# Patient Record
Sex: FEMALE | Race: WHITE | NOT HISPANIC OR LATINO | Employment: STUDENT | URBAN - NONMETROPOLITAN AREA
[De-identification: names, ages, dates, MRNs, and addresses within clinical notes are randomized per-mention and may not be internally consistent; named-entity substitution may affect disease eponyms.]

---
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
## 2023-05-31 ENCOUNTER — OFFICE VISIT (OUTPATIENT)
Dept: FAMILY MEDICINE CLINIC | Facility: CLINIC | Age: 23
End: 2023-05-31
Payer: COMMERCIAL

## 2023-05-31 VITALS
TEMPERATURE: 97.5 F | OXYGEN SATURATION: 98 % | WEIGHT: 207 LBS | DIASTOLIC BLOOD PRESSURE: 85 MMHG | HEART RATE: 97 BPM | HEIGHT: 65 IN | BODY MASS INDEX: 34.49 KG/M2 | SYSTOLIC BLOOD PRESSURE: 117 MMHG

## 2023-05-31 DIAGNOSIS — Z13.220 ENCOUNTER FOR SCREENING FOR LIPID DISORDER: ICD-10-CM

## 2023-05-31 DIAGNOSIS — R59.0 POSTERIOR AURICULAR LYMPHADENOPATHY: Primary | ICD-10-CM

## 2023-05-31 DIAGNOSIS — Z13.1 SCREENING FOR DIABETES MELLITUS: ICD-10-CM

## 2023-05-31 DIAGNOSIS — Z13.0 SCREENING FOR DEFICIENCY ANEMIA: ICD-10-CM

## 2023-05-31 DIAGNOSIS — E66.09 CLASS 1 OBESITY DUE TO EXCESS CALORIES WITHOUT SERIOUS COMORBIDITY IN ADULT, UNSPECIFIED BMI: ICD-10-CM

## 2023-05-31 DIAGNOSIS — Z00.00 ANNUAL PHYSICAL EXAM: Primary | ICD-10-CM

## 2023-05-31 DIAGNOSIS — Z23 NEED FOR VACCINATION: ICD-10-CM

## 2023-05-31 DIAGNOSIS — Z11.59 ENCOUNTER FOR HEPATITIS C SCREENING TEST FOR LOW RISK PATIENT: ICD-10-CM

## 2023-05-31 DIAGNOSIS — Z13.29 SCREENING FOR THYROID DISORDER: ICD-10-CM

## 2023-05-31 PROBLEM — J02.9 ACUTE PHARYNGITIS: Status: ACTIVE | Noted: 2023-03-14

## 2023-05-31 PROBLEM — F32.A DEPRESSIVE DISORDER: Status: ACTIVE | Noted: 2021-09-22

## 2023-05-31 PROBLEM — K52.9 CHRONIC DIARRHEA: Status: ACTIVE | Noted: 2019-09-16

## 2023-05-31 PROBLEM — A09: Status: ACTIVE | Noted: 2019-09-16

## 2023-05-31 PROBLEM — J06.9 ACUTE UPPER RESPIRATORY INFECTION: Status: ACTIVE | Noted: 2023-03-14

## 2023-05-31 PROBLEM — H65.03 BILATERAL ACUTE SEROUS OTITIS MEDIA: Status: ACTIVE | Noted: 2023-03-27

## 2023-05-31 PROBLEM — R00.0 INAPPROPRIATE SINUS TACHYCARDIA: Status: ACTIVE | Noted: 2022-04-05

## 2023-05-31 PROBLEM — J02.9 SORE THROAT: Status: ACTIVE | Noted: 2023-03-14

## 2023-05-31 PROBLEM — R19.7 DIARRHEA: Status: ACTIVE | Noted: 2019-09-16

## 2023-05-31 RX ORDER — ESCITALOPRAM OXALATE 10 MG/1
10 TABLET ORAL DAILY
COMMUNITY

## 2023-05-31 RX ORDER — ETONOGESTREL 68 MG/1
1 IMPLANT SUBCUTANEOUS ONCE
COMMUNITY

## 2023-05-31 NOTE — PROGRESS NOTES
Subjective     Chief Complaint   Patient presents with    Immunizations     Needs tdap for fall school      Annual Exam              History of Present Illness    Patient presents today to establish care and for annual physical.  She is needing a TDAP for [facility].  She starts school in the fall.  Has pap scheduled for July.      Patient's PMR from outside medical facility reviewed and noted.    Review of Systems   Constitutional:  Negative for fatigue.   Endocrine: Negative for polydipsia, polyphagia and polyuria.   Genitourinary:  Negative for menstrual problem, vaginal bleeding, vaginal discharge and vaginal pain.      Otherwise complete ROS reviewed and negative except as mentioned in the HPI.    Past Medical History:   Past Medical History:   Diagnosis Date    Bilateral acute serous otitis media 3/27/2023    Depressive disorder 9/22/2021     Past Surgical History:  Past Surgical History:   Procedure Laterality Date    CHOLECYSTECTOMY       Social History:  reports that she has never smoked. She has never used smokeless tobacco. She reports that she does not drink alcohol and does not use drugs.    Family History: family history includes No Known Problems in her father and mother.      Allergies:  Allergies   Allergen Reactions    Amoxicillin-Pot Clavulanate Other (See Comments)    Cefdinir GI Intolerance     Medications:  Prior to Admission medications    Medication Sig Start Date End Date Taking? Authorizing Provider   escitalopram (LEXAPRO) 10 MG tablet Take 1 tablet by mouth Daily.   Yes Chandana Purvis MD   Etonogestrel (Nexplanon) 68 MG implant subdermal implant Inject 1 each into the appropriate area of the skin as directed by provider 1 (One) Time.   Yes Chandana Purvis MD       IRIS: Over the last two weeks, how often have you been bothered by the following problems?  Feeling nervous, anxious or on edge: Not at all  Not being able to stop or control worrying: Not at  "all  Worrying too much about different things: Not at all  Trouble Relaxing: Not at all  Being so restless that it is hard to sit still: Not at all  Becoming easily annoyed or irritable: Not at all  Feeling afraid as if something awful might happen: Not at all  IRIS 7 Total Score: 0  If you checked any problems, how difficult have these problems made it for you to do your work, take care of things at home, or get along with other people: Not difficult at all      PHQ-9 Depression Screening  Little interest or pleasure in doing things? 0-->not at all   Feeling down, depressed, or hopeless? 0-->not at all   Trouble falling or staying asleep, or sleeping too much?     Feeling tired or having little energy?     Poor appetite or overeating?     Feeling bad about yourself - or that you are a failure or have let yourself or your family down?     Trouble concentrating on things, such as reading the newspaper or watching television?     Moving or speaking so slowly that other people could have noticed? Or the opposite - being so fidgety or restless that you have been moving around a lot more than usual?     Thoughts that you would be better off dead, or of hurting yourself in some way?     PHQ-9 Total Score 0   If you checked off any problems, how difficult have these problems made it for you to do your work, take care of things at home, or get along with other people?         PHQ-9 Total Score: 0   0 (Negative screening for depression)      Objective     Vital Signs: /85 (BP Location: Left arm, Patient Position: Sitting, Cuff Size: Adult)   Pulse 97   Temp 97.5 °F (36.4 °C) (Infrared)   Ht 165.1 cm (65\")   Wt 93.9 kg (207 lb)   SpO2 98%   BMI 34.45 kg/m²   Physical Exam  Vitals and nursing note reviewed.   Constitutional:       Appearance: She is obese.   HENT:      Head: Normocephalic.      Right Ear: Tympanic membrane normal.      Left Ear: Tympanic membrane normal.      Nose: Nose normal.      Mouth/Throat:    "   Mouth: Mucous membranes are moist.   Eyes:      Extraocular Movements: Extraocular movements intact.      Pupils: Pupils are equal, round, and reactive to light.   Cardiovascular:      Rate and Rhythm: Normal rate and regular rhythm.      Pulses: Normal pulses.      Heart sounds: Normal heart sounds.   Pulmonary:      Effort: Pulmonary effort is normal.      Breath sounds: Normal breath sounds.   Abdominal:      General: Bowel sounds are normal.      Palpations: Abdomen is soft.   Musculoskeletal:         General: Normal range of motion.      Cervical back: Normal range of motion.   Skin:     General: Skin is warm and dry.   Neurological:      General: No focal deficit present.      Mental Status: She is alert and oriented to person, place, and time.   Psychiatric:         Mood and Affect: Mood normal.         Behavior: Behavior normal.         Thought Content: Thought content normal.         Judgment: Judgment normal.       BMI is >= 30 and <35. (Class 1 Obesity). The following options were offered after discussion;: exercise counseling/recommendations and nutrition counseling/recommendations        Results Reviewed:  Glucose   Date Value Ref Range Status   05/31/2023 87 70 - 99 mg/dL Final     BUN   Date Value Ref Range Status   05/31/2023 10 6 - 20 mg/dL Final     Creatinine   Date Value Ref Range Status   05/31/2023 0.86 0.57 - 1.00 mg/dL Final     Sodium   Date Value Ref Range Status   05/31/2023 141 134 - 144 mmol/L Final     Potassium   Date Value Ref Range Status   05/31/2023 4.5 3.5 - 5.2 mmol/L Final     Chloride   Date Value Ref Range Status   05/31/2023 103 96 - 106 mmol/L Final     Total CO2   Date Value Ref Range Status   05/31/2023 21 20 - 29 mmol/L Final     Calcium   Date Value Ref Range Status   05/31/2023 10.1 8.7 - 10.2 mg/dL Final     ALT (SGPT)   Date Value Ref Range Status   05/31/2023 16 0 - 32 IU/L Final     AST (SGOT)   Date Value Ref Range Status   05/31/2023 17 0 - 40 IU/L Final     WBC    Date Value Ref Range Status   05/31/2023 9.1 3.4 - 10.8 x10E3/uL Final     Hematocrit   Date Value Ref Range Status   05/31/2023 43.8 34.0 - 46.6 % Final     Platelets   Date Value Ref Range Status   05/31/2023 286 150 - 450 x10E3/uL Final     Triglycerides   Date Value Ref Range Status   05/31/2023 116 0 - 149 mg/dL Final     HDL Cholesterol   Date Value Ref Range Status   05/31/2023 54 >39 mg/dL Final     LDL Chol Calc (NIH)   Date Value Ref Range Status   05/31/2023 118 (H) 0 - 99 mg/dL Final     Hemoglobin A1C   Date Value Ref Range Status   05/31/2023 5.4 4.8 - 5.6 % Final     Comment:              Prediabetes: 5.7 - 6.4           Diabetes: >6.4           Glycemic control for adults with diabetes: <7.0           Assessment / Plan     Assessment/Plan     Diagnoses and all orders for this visit:    1. Annual physical exam (Primary)    2. Need for vaccination  -     Tdap Vaccine Greater Than or Equal To 8yo IM    3. Encounter for screening for lipid disorder  -     Comprehensive Metabolic Panel  -     Lipid Panel    4. Screening for deficiency anemia  -     CBC & Differential    5. Class 1 obesity due to excess calories without serious comorbidity in adult, unspecified BMI  -     Comprehensive Metabolic Panel  -     Hemoglobin A1c  -     Lipid Panel  -     TSH+Free T4    6. Screening for thyroid disorder  -     TSH+Free T4    7. Screening for diabetes mellitus  -     Comprehensive Metabolic Panel  -     Hemoglobin A1c    8. Encounter for hepatitis C screening test for low risk patient  -     Hepatitis C Antibody    Health Maintenance Counseling:  --Nutrition: Stressed importance of moderation in sodium/caffeine intake, saturated fat and cholesterol, caloric balance, sufficient intake of fresh fruits, vegetables, fiber, calcium and vit D  --Exercise: Recommended 30 minutes of exercise daily.  --Immunizations discussed and encouraged.  DTAP vaccination ordered today.        An After Visit Summary was printed and  given to the patient at discharge.  Return in about 1 year (around 5/31/2024) for Annual physical.    I have discussed the patient results/orders and plan/recommendation with them at today's visit.      Patricia Byers, APRN   05/31/2023

## 2023-06-01 DIAGNOSIS — E78.00 PURE HYPERCHOLESTEROLEMIA: Primary | ICD-10-CM

## 2023-06-01 LAB
ALBUMIN SERPL-MCNC: 4.9 G/DL (ref 3.9–5)
ALBUMIN/GLOB SERPL: 1.8 {RATIO} (ref 1.2–2.2)
ALP SERPL-CCNC: 120 IU/L (ref 44–121)
ALT SERPL-CCNC: 16 IU/L (ref 0–32)
AST SERPL-CCNC: 17 IU/L (ref 0–40)
BASOPHILS # BLD AUTO: 0.1 X10E3/UL (ref 0–0.2)
BASOPHILS NFR BLD AUTO: 1 %
BILIRUB SERPL-MCNC: 0.5 MG/DL (ref 0–1.2)
BUN SERPL-MCNC: 10 MG/DL (ref 6–20)
BUN/CREAT SERPL: 12 (ref 9–23)
CALCIUM SERPL-MCNC: 10.1 MG/DL (ref 8.7–10.2)
CHLORIDE SERPL-SCNC: 103 MMOL/L (ref 96–106)
CHOLEST SERPL-MCNC: 193 MG/DL (ref 100–199)
CO2 SERPL-SCNC: 21 MMOL/L (ref 20–29)
CREAT SERPL-MCNC: 0.86 MG/DL (ref 0.57–1)
EGFRCR SERPLBLD CKD-EPI 2021: 98 ML/MIN/1.73
EOSINOPHIL # BLD AUTO: 0.2 X10E3/UL (ref 0–0.4)
EOSINOPHIL NFR BLD AUTO: 2 %
ERYTHROCYTE [DISTWIDTH] IN BLOOD BY AUTOMATED COUNT: 13.7 % (ref 11.7–15.4)
GLOBULIN SER CALC-MCNC: 2.7 G/DL (ref 1.5–4.5)
GLUCOSE SERPL-MCNC: 87 MG/DL (ref 70–99)
HBA1C MFR BLD: 5.4 % (ref 4.8–5.6)
HCT VFR BLD AUTO: 43.8 % (ref 34–46.6)
HCV IGG SERPL QL IA: NON REACTIVE
HDLC SERPL-MCNC: 54 MG/DL
HGB BLD-MCNC: 15.1 G/DL (ref 11.1–15.9)
IMM GRANULOCYTES # BLD AUTO: 0 X10E3/UL (ref 0–0.1)
IMM GRANULOCYTES NFR BLD AUTO: 0 %
LDLC SERPL CALC-MCNC: 118 MG/DL (ref 0–99)
LYMPHOCYTES # BLD AUTO: 2.5 X10E3/UL (ref 0.7–3.1)
LYMPHOCYTES NFR BLD AUTO: 28 %
MCH RBC QN AUTO: 28.8 PG (ref 26.6–33)
MCHC RBC AUTO-ENTMCNC: 34.5 G/DL (ref 31.5–35.7)
MCV RBC AUTO: 83 FL (ref 79–97)
MONOCYTES # BLD AUTO: 0.7 X10E3/UL (ref 0.1–0.9)
MONOCYTES NFR BLD AUTO: 7 %
NEUTROPHILS # BLD AUTO: 5.7 X10E3/UL (ref 1.4–7)
NEUTROPHILS NFR BLD AUTO: 62 %
PLATELET # BLD AUTO: 286 X10E3/UL (ref 150–450)
POTASSIUM SERPL-SCNC: 4.5 MMOL/L (ref 3.5–5.2)
PROT SERPL-MCNC: 7.6 G/DL (ref 6–8.5)
RBC # BLD AUTO: 5.25 X10E6/UL (ref 3.77–5.28)
SODIUM SERPL-SCNC: 141 MMOL/L (ref 134–144)
T4 FREE SERPL-MCNC: 1.04 NG/DL (ref 0.82–1.77)
TRIGL SERPL-MCNC: 116 MG/DL (ref 0–149)
TSH SERPL DL<=0.005 MIU/L-ACNC: 1.46 UIU/ML (ref 0.45–4.5)
VLDLC SERPL CALC-MCNC: 21 MG/DL (ref 5–40)
WBC # BLD AUTO: 9.1 X10E3/UL (ref 3.4–10.8)

## 2023-06-02 VITALS
WEIGHT: 207 LBS | OXYGEN SATURATION: 98 % | TEMPERATURE: 97.5 F | BODY MASS INDEX: 34.49 KG/M2 | DIASTOLIC BLOOD PRESSURE: 85 MMHG | HEIGHT: 65 IN | HEART RATE: 97 BPM | SYSTOLIC BLOOD PRESSURE: 117 MMHG

## 2023-06-02 NOTE — PROGRESS NOTES
"        Subjective     Chief Complaint   Patient presents with    Cyst       History of Present Illness  Patient presents today with a lump on her neck behind her right ear that has been there for a couple of months.  It does not hurt and has not increased in size.     Patient's PMR from outside medical facility reviewed and noted.    Review of Systems   HENT:  Negative for ear pain, trouble swallowing and voice change.       Otherwise complete ROS reviewed and negative except as mentioned in the HPI.    Past Medical History:   Past Medical History:   Diagnosis Date    Bilateral acute serous otitis media 3/27/2023    Depressive disorder 9/22/2021     Past Surgical History:  Past Surgical History:   Procedure Laterality Date    CHOLECYSTECTOMY       Social History:  reports that she has never smoked. She has never used smokeless tobacco. She reports that she does not drink alcohol and does not use drugs.    Family History: family history includes No Known Problems in her father and mother.      Allergies:  Allergies   Allergen Reactions    Amoxicillin-Pot Clavulanate Other (See Comments)    Cefdinir GI Intolerance     Medications:  Prior to Admission medications    Medication Sig Start Date End Date Taking? Authorizing Provider   escitalopram (LEXAPRO) 10 MG tablet Take 1 tablet by mouth Daily.    ProviderChandana MD   Etonogestrel (Nexplanon) 68 MG implant subdermal implant Inject 1 each into the appropriate area of the skin as directed by provider 1 (One) Time.    ProviderChandana MD       Objective     Vital Signs: /85 (BP Location: Right arm, Patient Position: Sitting, Cuff Size: Adult)   Pulse 97   Temp 97.5 °F (36.4 °C) (Infrared)   Ht 165.1 cm (65\")   Wt 93.9 kg (207 lb)   SpO2 98%   BMI 34.45 kg/m²   Physical Exam  Vitals and nursing note reviewed.   Constitutional:       Appearance: Normal appearance. She is obese.   HENT:      Head: Normocephalic.        Nose: Nose normal.      " Mouth/Throat:      Mouth: Mucous membranes are moist.   Eyes:      Extraocular Movements: Extraocular movements intact.      Pupils: Pupils are equal, round, and reactive to light.   Neck:      Comments: Palpable nodule in location of posterior auricular lymph node  Cardiovascular:      Rate and Rhythm: Normal rate and regular rhythm.      Pulses: Normal pulses.      Heart sounds: Normal heart sounds.   Pulmonary:      Effort: Pulmonary effort is normal.      Breath sounds: Normal breath sounds.   Musculoskeletal:         General: Normal range of motion.      Cervical back: Normal range of motion.   Lymphadenopathy:      Cervical: Cervical adenopathy present.   Skin:     General: Skin is warm and dry.   Neurological:      General: No focal deficit present.      Mental Status: She is alert and oriented to person, place, and time.   Psychiatric:         Mood and Affect: Mood normal.         Behavior: Behavior normal.         Thought Content: Thought content normal.         Judgment: Judgment normal.       BMI is >= 30 and <35. (Class 1 Obesity). The following options were offered after discussion;: exercise counseling/recommendations and nutrition counseling/recommendations        Results Reviewed:  Glucose   Date Value Ref Range Status   05/31/2023 87 70 - 99 mg/dL Final     BUN   Date Value Ref Range Status   05/31/2023 10 6 - 20 mg/dL Final     Creatinine   Date Value Ref Range Status   05/31/2023 0.86 0.57 - 1.00 mg/dL Final     Sodium   Date Value Ref Range Status   05/31/2023 141 134 - 144 mmol/L Final     Potassium   Date Value Ref Range Status   05/31/2023 4.5 3.5 - 5.2 mmol/L Final     Chloride   Date Value Ref Range Status   05/31/2023 103 96 - 106 mmol/L Final     Total CO2   Date Value Ref Range Status   05/31/2023 21 20 - 29 mmol/L Final     Calcium   Date Value Ref Range Status   05/31/2023 10.1 8.7 - 10.2 mg/dL Final     ALT (SGPT)   Date Value Ref Range Status   05/31/2023 16 0 - 32 IU/L Final     AST  (SGOT)   Date Value Ref Range Status   05/31/2023 17 0 - 40 IU/L Final     WBC   Date Value Ref Range Status   05/31/2023 9.1 3.4 - 10.8 x10E3/uL Final     Hematocrit   Date Value Ref Range Status   05/31/2023 43.8 34.0 - 46.6 % Final     Platelets   Date Value Ref Range Status   05/31/2023 286 150 - 450 x10E3/uL Final     Triglycerides   Date Value Ref Range Status   05/31/2023 116 0 - 149 mg/dL Final     HDL Cholesterol   Date Value Ref Range Status   05/31/2023 54 >39 mg/dL Final     LDL Chol Calc (NIH)   Date Value Ref Range Status   05/31/2023 118 (H) 0 - 99 mg/dL Final     Hemoglobin A1C   Date Value Ref Range Status   05/31/2023 5.4 4.8 - 5.6 % Final     Comment:              Prediabetes: 5.7 - 6.4           Diabetes: >6.4           Glycemic control for adults with diabetes: <7.0           Assessment / Plan     Assessment/Plan     Diagnoses and all orders for this visit:    1. Posterior auricular lymphadenopathy (Primary)  -     US Head Neck Soft Tissue; Future    2. Class 1 obesity due to excess calories without serious comorbidity in adult, unspecified BMI         An After Visit Summary was printed and given to the patient at discharge.  Return if symptoms worsen or fail to improve.    I have discussed the patient results/orders and plan/recommendation with them at today's visit.      Patricia Byers, APRN   05/31/2023

## 2023-06-14 PROBLEM — E66.09 CLASS 1 OBESITY DUE TO EXCESS CALORIES WITHOUT SERIOUS COMORBIDITY IN ADULT: Status: ACTIVE | Noted: 2023-06-14
